# Patient Record
Sex: FEMALE | Race: WHITE | NOT HISPANIC OR LATINO
[De-identification: names, ages, dates, MRNs, and addresses within clinical notes are randomized per-mention and may not be internally consistent; named-entity substitution may affect disease eponyms.]

---

## 2021-07-12 ENCOUNTER — APPOINTMENT (OUTPATIENT)
Dept: OPHTHALMOLOGY | Facility: CLINIC | Age: 81
End: 2021-07-12

## 2021-07-13 ENCOUNTER — APPOINTMENT (OUTPATIENT)
Dept: OPHTHALMOLOGY | Facility: CLINIC | Age: 81
End: 2021-07-13

## 2021-07-20 ENCOUNTER — NON-APPOINTMENT (OUTPATIENT)
Age: 81
End: 2021-07-20

## 2021-07-20 ENCOUNTER — APPOINTMENT (OUTPATIENT)
Dept: OPHTHALMOLOGY | Facility: CLINIC | Age: 81
End: 2021-07-20
Payer: MEDICARE

## 2021-07-20 PROCEDURE — 92134 CPTRZ OPH DX IMG PST SGM RTA: CPT

## 2021-07-20 PROCEDURE — 92014 COMPRE OPH EXAM EST PT 1/>: CPT

## 2021-10-07 PROBLEM — Z00.00 ENCOUNTER FOR PREVENTIVE HEALTH EXAMINATION: Status: ACTIVE | Noted: 2021-10-07

## 2021-10-08 ENCOUNTER — APPOINTMENT (OUTPATIENT)
Dept: GASTROENTEROLOGY | Facility: CLINIC | Age: 81
End: 2021-10-08
Payer: MEDICARE

## 2021-10-08 DIAGNOSIS — K44.9 DIAPHRAGMATIC HERNIA W/OUT OBSTRUCTION OR GANGRENE: ICD-10-CM

## 2021-10-08 PROCEDURE — 99443: CPT | Mod: 95

## 2021-10-08 RX ORDER — ASPIRIN 81 MG
81 TABLET, DELAYED RELEASE (ENTERIC COATED) ORAL
Refills: 0 | Status: ACTIVE | COMMUNITY

## 2021-10-08 RX ORDER — BUDESONIDE AND FORMOTEROL FUMARATE DIHYDRATE 160; 4.5 UG/1; UG/1
160-4.5 AEROSOL RESPIRATORY (INHALATION)
Refills: 0 | Status: ACTIVE | COMMUNITY

## 2021-10-08 RX ORDER — AMLODIPINE BESYLATE 5 MG/1
5 TABLET ORAL
Refills: 0 | Status: ACTIVE | COMMUNITY

## 2021-10-08 RX ORDER — RAMIPRIL 10 MG/1
10 CAPSULE ORAL
Refills: 0 | Status: ACTIVE | COMMUNITY

## 2021-10-08 RX ORDER — ASCORBIC ACID 500 MG
TABLET ORAL
Refills: 0 | Status: ACTIVE | COMMUNITY

## 2021-10-08 RX ORDER — EPINEPHRINE CONVENIENCE KIT 1 MG/ML(1)
KIT INJECTION
Refills: 0 | Status: ACTIVE | COMMUNITY

## 2021-10-08 RX ORDER — EZETIMIBE 10 MG/1
10 TABLET ORAL
Refills: 0 | Status: ACTIVE | COMMUNITY

## 2021-10-08 RX ORDER — LEVALBUTEROL HYDROCHLORIDE 0.63 MG/3ML
0.63 SOLUTION RESPIRATORY (INHALATION)
Refills: 0 | Status: ACTIVE | COMMUNITY

## 2021-10-08 RX ORDER — CLONIDINE HYDROCHLORIDE 0.1 MG/1
0.1 TABLET ORAL
Refills: 0 | Status: ACTIVE | COMMUNITY

## 2021-10-08 RX ORDER — CALCIUM CITRATE/VITAMIN D3 315MG-6.25
TABLET ORAL
Refills: 0 | Status: ACTIVE | COMMUNITY

## 2021-10-08 NOTE — HISTORY OF PRESENT ILLNESS
[de-identified] : Due to COVID 19 pandemic, telephonic visit was scheduled to decrease any chance of exposure. Verbal consent was obtained from the patient.\par \par 81-year-old woman who is being evaluated for increase in the size of the pancreatic cystic lesions.  She also has history of chronic constipation and diverticulitis requiring partial colon resection in the past.  She has been on medication for chronic constipation.  She was noted to have pancreatic cystic lesion about 40 years ago and now there is increase in the size of time.  I reviewed the CT abdominal and the MRI abdominal results lately.  She has history of asthma.  That is well controlled.  She also has seen cardiologist.  The largest pancreatic cystic lesion was 2.5 cm x 1.2 cm.  It was previously 1 cm x 0.4 cm.  The second largest lesion is 1.4 cm x 1.1 cm which was previously 1.2 cm x 0.8 cm.  Denies any history of pancreatitis.  She denies any family history of pancreatic cancer.\par

## 2021-10-08 NOTE — ASSESSMENT
[FreeTextEntry1] : I have discussed her CT abdominal and MRI abdomen at length.  At this time, we will request a pulmonary and cardiology evaluation prior to the scheduling of EGD/EUS.  Patient otherwise appears medically optimized.  We discussed the differential diagnosis of the pancreatic cystic lesions at length. Risks (including bleeding, pain, perforation, incomplete examination, adverse reactions to medications, aspiration and death), benefits and alternatives were discussed. Patient is agreeable for the EGD. The patient is medically optimized for the procedure. We will schedule the patient for the procedure.\par \par I spent 25 minutes on the encounter\par \par \par Nic Yi MD\par Gastroenterology \par \par

## 2021-10-28 ENCOUNTER — TRANSCRIPTION ENCOUNTER (OUTPATIENT)
Age: 81
End: 2021-10-28

## 2021-11-03 ENCOUNTER — APPOINTMENT (OUTPATIENT)
Dept: GASTROENTEROLOGY | Facility: CLINIC | Age: 81
End: 2021-11-03

## 2021-11-07 DIAGNOSIS — Z01.818 ENCOUNTER FOR OTHER PREPROCEDURAL EXAMINATION: ICD-10-CM

## 2021-11-08 ENCOUNTER — APPOINTMENT (OUTPATIENT)
Dept: DISASTER EMERGENCY | Facility: CLINIC | Age: 81
End: 2021-11-08

## 2021-11-09 LAB — SARS-COV-2 N GENE NPH QL NAA+PROBE: NOT DETECTED

## 2021-11-11 ENCOUNTER — APPOINTMENT (OUTPATIENT)
Dept: GASTROENTEROLOGY | Facility: HOSPITAL | Age: 81
End: 2021-11-11

## 2021-11-11 ENCOUNTER — TRANSCRIPTION ENCOUNTER (OUTPATIENT)
Age: 81
End: 2021-11-11

## 2021-11-11 ENCOUNTER — OUTPATIENT (OUTPATIENT)
Dept: OUTPATIENT SERVICES | Facility: HOSPITAL | Age: 81
LOS: 1 days | End: 2021-11-11
Payer: MEDICARE

## 2021-11-11 ENCOUNTER — RESULT REVIEW (OUTPATIENT)
Age: 81
End: 2021-11-11

## 2021-11-11 DIAGNOSIS — K86.2 CYST OF PANCREAS: ICD-10-CM

## 2021-11-11 PROCEDURE — 43242 EGD US FINE NEEDLE BX/ASPIR: CPT

## 2021-11-11 PROCEDURE — 88173 CYTOPATH EVAL FNA REPORT: CPT

## 2021-11-11 PROCEDURE — 88305 TISSUE EXAM BY PATHOLOGIST: CPT

## 2021-11-11 PROCEDURE — 88342 IMHCHEM/IMCYTCHM 1ST ANTB: CPT

## 2021-11-11 PROCEDURE — 43238 EGD US FINE NEEDLE BX/ASPIR: CPT

## 2021-11-11 PROCEDURE — 88342 IMHCHEM/IMCYTCHM 1ST ANTB: CPT | Mod: 26

## 2021-11-11 PROCEDURE — 88305 TISSUE EXAM BY PATHOLOGIST: CPT | Mod: 26

## 2021-11-11 PROCEDURE — 43239 EGD BIOPSY SINGLE/MULTIPLE: CPT | Mod: 59

## 2021-11-11 PROCEDURE — 88173 CYTOPATH EVAL FNA REPORT: CPT | Mod: 26

## 2021-11-16 LAB
NON-GYNECOLOGICAL CYTOLOGY STUDY: SIGNIFICANT CHANGE UP
SURGICAL PATHOLOGY STUDY: SIGNIFICANT CHANGE UP

## 2021-11-22 ENCOUNTER — APPOINTMENT (OUTPATIENT)
Dept: OPHTHALMOLOGY | Facility: CLINIC | Age: 81
End: 2021-11-22
Payer: MEDICARE

## 2021-11-22 ENCOUNTER — NON-APPOINTMENT (OUTPATIENT)
Age: 81
End: 2021-11-22

## 2021-11-22 PROCEDURE — 92012 INTRM OPH EXAM EST PATIENT: CPT

## 2021-12-12 ENCOUNTER — TRANSCRIPTION ENCOUNTER (OUTPATIENT)
Age: 81
End: 2021-12-12

## 2021-12-29 ENCOUNTER — APPOINTMENT (OUTPATIENT)
Dept: GASTROENTEROLOGY | Facility: CLINIC | Age: 81
End: 2021-12-29
Payer: MEDICARE

## 2021-12-29 VITALS
RESPIRATION RATE: 16 BRPM | DIASTOLIC BLOOD PRESSURE: 60 MMHG | BODY MASS INDEX: 23.46 KG/M2 | TEMPERATURE: 98 F | OXYGEN SATURATION: 97 % | SYSTOLIC BLOOD PRESSURE: 110 MMHG | WEIGHT: 146 LBS | HEART RATE: 89 BPM | HEIGHT: 66 IN

## 2021-12-29 DIAGNOSIS — K59.09 OTHER CONSTIPATION: ICD-10-CM

## 2021-12-29 PROCEDURE — 99213 OFFICE O/P EST LOW 20 MIN: CPT

## 2021-12-29 NOTE — ASSESSMENT
[FreeTextEntry1] : I discussed the EUS findings at length.  The patient has statistically indolent mucinous cystic lesion.  I have recommended an MRI in April along with MRCP.  We will evaluate her in March again to order the MRI.  If there is any further change in the characteristic or the size of the cystic lesion, she will need a FNA.\par \par Nic Yi MD\par Gastroenterology \par \par

## 2021-12-29 NOTE — HISTORY OF PRESENT ILLNESS
[de-identified] : Patient arrived for a follow-up visit. She has history of pancreatic cystic lesions. He has history of chronic constipation and diverticulitis requiring partial colon resection in the past. She underwent EGD and EUS with FNA of the pancreatic cystic lesion.There was a multilobulated pancreatic neck lesion which measured 2.5 x 1 cm. There was additionally a pancreatic body cystic lesion which could not have FNA due to the PD in the way of FNA trajectory. She had a large hiatal hernia. FNA revealed elevated CEA level of 130,000. Amylase was 175. It was statistically indolent on the interpace molecular analysis. Cytology was nondiagnostic.

## 2022-01-11 ENCOUNTER — APPOINTMENT (OUTPATIENT)
Dept: OPHTHALMOLOGY | Facility: CLINIC | Age: 82
End: 2022-01-11
Payer: MEDICARE

## 2022-01-11 ENCOUNTER — NON-APPOINTMENT (OUTPATIENT)
Age: 82
End: 2022-01-11

## 2022-01-11 PROCEDURE — 92014 COMPRE OPH EXAM EST PT 1/>: CPT

## 2022-01-11 PROCEDURE — 92250 FUNDUS PHOTOGRAPHY W/I&R: CPT

## 2022-03-17 ENCOUNTER — APPOINTMENT (OUTPATIENT)
Dept: GASTROENTEROLOGY | Facility: CLINIC | Age: 82
End: 2022-03-17
Payer: MEDICARE

## 2022-03-17 DIAGNOSIS — Z09 ENCOUNTER FOR FOLLOW-UP EXAMINATION AFTER COMPLETED TREATMENT FOR CONDITIONS OTHER THAN MALIGNANT NEOPLASM: ICD-10-CM

## 2022-03-17 PROCEDURE — 99442: CPT | Mod: 95

## 2022-03-17 NOTE — HISTORY OF PRESENT ILLNESS
[de-identified] : Due to COVID 19 pandemic, telephonic visit was scheduled to decrease any chance of exposure. Verbal consent was obtained from the patient.  The patient was at home and I was present at [address] [place] [address]., [facility].\par She has history of pancreatic cystic lesions. He has history of chronic constipation and diverticulitis requiring partial colon resection in the past. She underwent EGD and EUS with FNA of the pancreatic cystic lesion.There was a multilobulated pancreatic neck lesion which measured 2.5 x 1 cm. There was additionally a pancreatic body cystic lesion which could not have FNA due to the PD in the way of FNA trajectory. She had a large hiatal hernia. FNA revealed elevated CEA level of 130,000. Amylase was 175. It was statistically indolent on the interpace molecular analysis. Cytology was nondiagnostic. \par \par The patient had seen Dr. Radha Tsang.  She has ordered repeat MRI which will be performed in middle of April.  Patient has no complaints currently.

## 2022-03-17 NOTE — ASSESSMENT
[FreeTextEntry1] : We had a nice discussion regarding the incidence, prevalence of the pancreatic cystic lesion and their outcomes.  Most likely the patient has statistically indolent mucinous cystic lesion.  If it stays stable on the MRI and MRCP, we will perform yearly/annual MRI surveillance.  However if there is any change in the size and characteristics of the cystic lesion repeat FNA or surgical oncology referral will be performed.  Extensive counseling was performed.  Follow-up on a telephonic platform in May 2022.\par \par I spent 15 minutes on the encounter\par \par Nic Yi MD\par Gastroenterology \par \par

## 2022-04-25 ENCOUNTER — APPOINTMENT (OUTPATIENT)
Dept: ORTHOPEDIC SURGERY | Facility: CLINIC | Age: 82
End: 2022-04-25

## 2022-05-03 ENCOUNTER — APPOINTMENT (OUTPATIENT)
Dept: PHYSICAL MEDICINE AND REHAB | Facility: CLINIC | Age: 82
End: 2022-05-03
Payer: MEDICARE

## 2022-05-03 VITALS
OXYGEN SATURATION: 100 % | SYSTOLIC BLOOD PRESSURE: 110 MMHG | BODY MASS INDEX: 23.78 KG/M2 | DIASTOLIC BLOOD PRESSURE: 60 MMHG | WEIGHT: 148 LBS | HEART RATE: 69 BPM | HEIGHT: 66 IN

## 2022-05-03 DIAGNOSIS — G43.909 MIGRAINE, UNSPECIFIED, NOT INTRACTABLE, W/OUT STATUS MIGRAINOSUS: ICD-10-CM

## 2022-05-03 DIAGNOSIS — Z83.3 FAMILY HISTORY OF DIABETES MELLITUS: ICD-10-CM

## 2022-05-03 DIAGNOSIS — J45.909 UNSPECIFIED ASTHMA, UNCOMPLICATED: ICD-10-CM

## 2022-05-03 DIAGNOSIS — Z82.49 FAMILY HISTORY OF ISCHEMIC HEART DISEASE AND OTHER DISEASES OF THE CIRCULATORY SYSTEM: ICD-10-CM

## 2022-05-03 DIAGNOSIS — E78.00 PURE HYPERCHOLESTEROLEMIA, UNSPECIFIED: ICD-10-CM

## 2022-05-03 DIAGNOSIS — Z82.3 FAMILY HISTORY OF STROKE: ICD-10-CM

## 2022-05-03 DIAGNOSIS — B99.9 UNSPECIFIED INFECTIOUS DISEASE: ICD-10-CM

## 2022-05-03 PROCEDURE — 99205 OFFICE O/P NEW HI 60 MIN: CPT

## 2022-05-03 RX ORDER — COMMON SHRIMP 0.1 MG/ML
INJECTION, SOLUTION PERCUTANEOUS
Refills: 0 | Status: ACTIVE | COMMUNITY

## 2022-05-03 RX ORDER — LEVOCETIRIZINE DIHYDROCHLORIDE 5 MG/1
TABLET, FILM COATED ORAL
Refills: 0 | Status: ACTIVE | COMMUNITY

## 2022-05-03 RX ORDER — EZETIMIBE 10 MG/1
TABLET ORAL
Refills: 0 | Status: ACTIVE | COMMUNITY

## 2022-05-03 RX ORDER — CLONIDINE HYDROCHLORIDE 0.3 MG/1
TABLET ORAL
Refills: 0 | Status: ACTIVE | COMMUNITY

## 2022-05-03 RX ORDER — ESOMEPRAZOLE MAGNESIUM 40 MG/1
40 CAPSULE, DELAYED RELEASE ORAL
Refills: 0 | Status: ACTIVE | COMMUNITY

## 2022-05-03 RX ORDER — BUDESONIDE AND FORMOTEROL FUMARATE DIHYDRATE 160; 4.5 UG/1; UG/1
AEROSOL RESPIRATORY (INHALATION)
Refills: 0 | Status: ACTIVE | COMMUNITY

## 2022-05-03 RX ORDER — SPIRONOLACTONE 50 MG/1
TABLET ORAL
Refills: 0 | Status: ACTIVE | COMMUNITY

## 2022-05-03 RX ORDER — EPINEPHRINE 1 MG/ML
INJECTION INTRAMUSCULAR; INTRAVENOUS; SUBCUTANEOUS
Refills: 0 | Status: ACTIVE | COMMUNITY

## 2022-05-03 RX ORDER — LEVALBUTEROL HYDROCHLORIDE 0.31 MG/3ML
SOLUTION RESPIRATORY (INHALATION)
Refills: 0 | Status: ACTIVE | COMMUNITY

## 2022-05-03 RX ORDER — ROSUVASTATIN CALCIUM 5 MG/1
TABLET, FILM COATED ORAL
Refills: 0 | Status: ACTIVE | COMMUNITY

## 2022-05-03 RX ORDER — HYOSCYAMINE SULFATE 0.15 MG
TABLET ORAL
Refills: 0 | Status: ACTIVE | COMMUNITY

## 2022-05-03 RX ORDER — TRAMADOL HYDROCHLORIDE 25 MG/1
TABLET, COATED ORAL
Refills: 0 | Status: ACTIVE | COMMUNITY

## 2022-05-03 RX ORDER — RAMIPRIL 10 MG/1
10 CAPSULE ORAL
Refills: 0 | Status: ACTIVE | COMMUNITY

## 2022-05-03 RX ORDER — AMLODIPINE AND ATORVASTATIN 2.5; 4 MG/1; MG/1
TABLET, COATED ORAL
Refills: 0 | Status: ACTIVE | COMMUNITY

## 2022-05-03 NOTE — ASSESSMENT
[FreeTextEntry1] : Will initiate Trigger Point Injections 1xweekly/6weeks to lumbar paraspinal and gluteal musculature. The goal of which is to decrease pain, dissipate muscle spasm and improve level of function. \par Continue to PT to L/S 2xweek/4weeks\par Follow-up with Dr. Seay. \par HEP reviewed with pt.\par Instruction in proper body mechanics and posturing.\par \par Recheck in 4-6 weeks.

## 2022-05-03 NOTE — CONSULT LETTER
[Consult Letter:] : I had the pleasure of evaluating your patient, [unfilled]. [Please see my note below.] : Please see my note below. [Sincerely,] : Sincerely, [FreeTextEntry3] : Marquez Sarah MD

## 2022-05-03 NOTE — HISTORY OF PRESENT ILLNESS
[FreeTextEntry1] : Pt is an 81 year old female who presents to my office for evaluation of low back pain with radicular pain and paraesthesia involving her left lower extremity. She reports the onset of pain was approximately the beginning of February of 2022. She is unaware of any precipitated events prior to the onset of her pain. Pt reports she was first evaluated by Dr. Alvarez for low back pain. MRI testing of the lumbar spine was obtained. She was advised she was a surgical candidate, however, she declined that option. Dr. Alvarez then referred her to Dr. Seay PM. Pt reports she was evaluated by Dr. Seay on 03/21/2022 and a lumbar WESTON was recommended. However, lumbar ESIs have not performed to date. Approximately 2 weeks ago she started PT to her low back. \par Pain is aggravated with prolonged sitting, standing and walking. Pain is alleviated with change of position and forward flexion. Denies bladder disfunction. Pt reports pain level is an 8/10 at rest and 10/10 at worst during activity.

## 2022-05-03 NOTE — PHYSICAL EXAM
[FreeTextEntry1] : LUMBAR & LOWER EXTREMITIES\par 	 \par UPON INSPECTION: \par THORACO LUMBAR SCOLIOSIS									EXAGGERATION THORACIC KYPHOSIS									\par \par REFLEXES (R) LE:		\par 	QUADRICEPS 2	 \par 	ACHILLES 2\par \par REFLEXES (L) LE:		\par 	QUADRICEPS 2		 \par                 ACHILLES 2\par \par SENSORY LE: Decreased sensation left L4 dermatome \par \par No gross atrophy 	\par 	\par Normal gait \par \par TESTING:\par 	BABINSKI [downgoing bilaterally ]\par 	CHADDOCK [- bilaterally ]\par 	OPPENHEIM [- bilaterally ]\par 	GONDA [ - bilaterally]\par 	CLONUS [- ankle bilaterally ]\par 	LESEAGUE’S (R) [ -]\par 	LESEAGUE’S (L) [- ]\par 		\par SI JT LIG.CHALLENEGE TEST (R) -\par SI JT LIG.CHALLENEGE TEST (L) +\par 	S.L.R. ( R ) -60 degrees with hamstring tightness\par                 S.L.R. ( R ) -60 degrees with hamstring tightness\par RANGE OF MOTION:\par 	FLEXION 55 degrees\par 	EXTENSION 8 degrees with paralumbar and left gluteal musculature \par 	LATERAL BENDING: ( R ) 24 degrees\par 	LATERAL BENDING: ( L ) 22 degrees\par 	THORACIC ROTATION ( R ) 25 degrees\par 	THORACIC ROTATION ( L ) 20 degrees\par  	INTERNAL ROTATION FEMUR ( R ) WNL\par 	EXTERNAL ROTATION FEMUR ( R ) WNL\par 	INTERNAL ROTATION FEMUR ( L ) WNL\par 	EXTERNAL ROTATION FEMUR ( R ) WNL\par \par Good peripheral pulses bilaterally \par VIBRATORY: mild difficulty on the left \par PROPRIOCEPTION: intact bilaterally \par 	MMT: intact \par Palpation of the lumbar spine: Tenderness involving the L4/L5 and L5/S1 interspace. SI joints are tender bilaterally. Tenderness and spasm bilateral lower lumbars with greater involvement on the left. Trigger points bilateral gluteal musculature with greater involvement on the left. Tenderness and spasm involving the left piriformis. [Normal] : Heart rate was normal and rhythm regular, normal S1 and S2, no gallops, no murmurs and no pericardial rub [de-identified] : see exam [de-identified] : see exam [de-identified] : see exam

## 2022-05-04 ENCOUNTER — APPOINTMENT (OUTPATIENT)
Dept: GASTROENTEROLOGY | Facility: CLINIC | Age: 82
End: 2022-05-04
Payer: MEDICARE

## 2022-05-04 PROCEDURE — 99441: CPT | Mod: 95

## 2022-05-04 NOTE — ASSESSMENT
[FreeTextEntry1] : I have discussed with the patient that she should undergo repeat MRI in 6 months.  We will follow her after that.  She can follow-up with Dr. Radha Tsang and with us simultaneously.\par \par I spent 10 minutes on the encounter\par \par Nic Yi MD\par Gastroenterology \par \par

## 2022-05-04 NOTE — HISTORY OF PRESENT ILLNESS
[de-identified] : Due to COVID 19 pandemic, telephonic visit was scheduled to decrease any chance of exposure. Verbal consent was obtained from the patient.\par The patient was at home and I was at [address] [place] [address] office.  The patient was initially referred for pancreatic cystic lesion evaluation.She has history of chronic constipation and diverticulitis requiring partial colon resection in the past. She underwent EGD and EUS with FNA of the pancreatic cystic lesion.There was a multilobulated pancreatic neck lesion which measured 2.5 x 1 cm. There was additionally a pancreatic body cystic lesion which could not have FNA due to the PD in the way of FNA trajectory. She had a large hiatal hernia. FNA revealed elevated CEA level of 130,000. Amylase was 175. It was statistically indolent on the interpace molecular analysis. Cytology was nondiagnostic. \par \par She had a repeat MRI performed. MRI revealed non change in the size of the cystic lesions. Rather they were less prominent. 6 months follow up was recommended.

## 2022-05-11 ENCOUNTER — APPOINTMENT (OUTPATIENT)
Dept: PHYSICAL MEDICINE AND REHAB | Facility: CLINIC | Age: 82
End: 2022-05-11
Payer: MEDICARE

## 2022-05-11 PROCEDURE — 20553 NJX 1/MLT TRIGGER POINTS 3/>: CPT

## 2022-05-11 PROCEDURE — 99213 OFFICE O/P EST LOW 20 MIN: CPT | Mod: 25

## 2022-05-11 RX ORDER — LIDOCAINE HYDROCHLORIDE 10 MG/ML
1 INJECTION, SOLUTION INFILTRATION; PERINEURAL
Refills: 0 | Status: COMPLETED | OUTPATIENT
Start: 2022-05-11

## 2022-05-12 ENCOUNTER — APPOINTMENT (OUTPATIENT)
Dept: PHYSICAL MEDICINE AND REHAB | Facility: CLINIC | Age: 82
End: 2022-05-12

## 2022-05-18 ENCOUNTER — APPOINTMENT (OUTPATIENT)
Dept: PHYSICAL MEDICINE AND REHAB | Facility: CLINIC | Age: 82
End: 2022-05-18
Payer: MEDICARE

## 2022-05-18 PROCEDURE — 99213 OFFICE O/P EST LOW 20 MIN: CPT | Mod: 25

## 2022-05-18 PROCEDURE — 20553 NJX 1/MLT TRIGGER POINTS 3/>: CPT

## 2022-05-25 ENCOUNTER — APPOINTMENT (OUTPATIENT)
Dept: PHYSICAL MEDICINE AND REHAB | Facility: CLINIC | Age: 82
End: 2022-05-25
Payer: MEDICARE

## 2022-05-25 PROCEDURE — 20553 NJX 1/MLT TRIGGER POINTS 3/>: CPT

## 2022-05-25 PROCEDURE — 99213 OFFICE O/P EST LOW 20 MIN: CPT | Mod: 25

## 2022-05-25 RX ORDER — LIDOCAINE HYDROCHLORIDE 10 MG/ML
1 INJECTION, SOLUTION INFILTRATION; PERINEURAL
Refills: 0 | Status: COMPLETED | OUTPATIENT
Start: 2022-05-25

## 2022-05-27 ENCOUNTER — APPOINTMENT (OUTPATIENT)
Dept: OPHTHALMOLOGY | Facility: CLINIC | Age: 82
End: 2022-05-27
Payer: MEDICARE

## 2022-05-27 ENCOUNTER — NON-APPOINTMENT (OUTPATIENT)
Age: 82
End: 2022-05-27

## 2022-05-27 PROCEDURE — 99213 OFFICE O/P EST LOW 20 MIN: CPT

## 2022-06-01 ENCOUNTER — APPOINTMENT (OUTPATIENT)
Dept: PHYSICAL MEDICINE AND REHAB | Facility: CLINIC | Age: 82
End: 2022-06-01

## 2022-06-08 ENCOUNTER — APPOINTMENT (OUTPATIENT)
Dept: PHYSICAL MEDICINE AND REHAB | Facility: CLINIC | Age: 82
End: 2022-06-08
Payer: MEDICARE

## 2022-06-08 PROCEDURE — 99213 OFFICE O/P EST LOW 20 MIN: CPT | Mod: 25

## 2022-06-08 PROCEDURE — 20553 NJX 1/MLT TRIGGER POINTS 3/>: CPT

## 2022-06-15 ENCOUNTER — APPOINTMENT (OUTPATIENT)
Dept: PHYSICAL MEDICINE AND REHAB | Facility: CLINIC | Age: 82
End: 2022-06-15

## 2022-06-15 PROCEDURE — 20553 NJX 1/MLT TRIGGER POINTS 3/>: CPT

## 2022-06-15 PROCEDURE — 99213 OFFICE O/P EST LOW 20 MIN: CPT | Mod: 25

## 2022-06-22 ENCOUNTER — APPOINTMENT (OUTPATIENT)
Dept: PHYSICAL MEDICINE AND REHAB | Facility: CLINIC | Age: 82
End: 2022-06-22

## 2022-06-22 PROCEDURE — 99213 OFFICE O/P EST LOW 20 MIN: CPT | Mod: 25

## 2022-06-22 PROCEDURE — 20553 NJX 1/MLT TRIGGER POINTS 3/>: CPT

## 2022-06-22 RX ADMIN — Medication 10 %: at 00:00

## 2022-06-29 ENCOUNTER — APPOINTMENT (OUTPATIENT)
Dept: PHYSICAL MEDICINE AND REHAB | Facility: CLINIC | Age: 82
End: 2022-06-29

## 2022-06-29 PROCEDURE — 99213 OFFICE O/P EST LOW 20 MIN: CPT

## 2022-06-29 NOTE — HISTORY OF PRESENT ILLNESS
[FreeTextEntry1] : Pt reports improvement with TPIs. Pt states she is able to stand and ambulate for longer periods of time. Less difficulty preforming ADLs

## 2022-06-29 NOTE — PHYSICAL EXAM
[FreeTextEntry1] : EXAMINATION OF LUMBAR SPINE:\par  \par Flexion:  60° \par Extension:  12°\par Lateral Bend: R: 27° \par                        L:  25°\par  \par Palpation of the Lumbar Spine: Decreasing tenderness and spasm left gluteal musculature. Tenderness and spasm improving bilateral lower lumbar paraspinals. \par \par MMT L/E: grossly intact \par \par \par \par  [Normal] : Heart rate was normal and rhythm regular, normal S1 and S2, no gallops, no murmurs and no pericardial rub [de-identified] : see exam [de-identified] : see exam [de-identified] : see exam

## 2022-06-29 NOTE — ASSESSMENT
[FreeTextEntry1] : Continue to TPI to L/S every other week x6 weeks. \par HEP reviewed with pt.\par Instruction in proper body mechanics and posturing.\par \par Recheck in 4-6 weeks.

## 2022-07-12 ENCOUNTER — APPOINTMENT (OUTPATIENT)
Dept: OPHTHALMOLOGY | Facility: CLINIC | Age: 82
End: 2022-07-12

## 2022-07-12 ENCOUNTER — NON-APPOINTMENT (OUTPATIENT)
Age: 82
End: 2022-07-12

## 2022-07-12 PROCEDURE — 92134 CPTRZ OPH DX IMG PST SGM RTA: CPT

## 2022-07-12 PROCEDURE — 92014 COMPRE OPH EXAM EST PT 1/>: CPT

## 2022-07-13 ENCOUNTER — APPOINTMENT (OUTPATIENT)
Dept: PHYSICAL MEDICINE AND REHAB | Facility: CLINIC | Age: 82
End: 2022-07-13

## 2022-08-03 ENCOUNTER — APPOINTMENT (OUTPATIENT)
Dept: PHYSICAL MEDICINE AND REHAB | Facility: CLINIC | Age: 82
End: 2022-08-03

## 2022-08-03 PROCEDURE — 99212 OFFICE O/P EST SF 10 MIN: CPT

## 2022-08-03 NOTE — HISTORY OF PRESENT ILLNESS
[FreeTextEntry1] : Pt reports good improvement with trigger point inj as it relates to her lower back complaints. Reports she is relatively pain free at the current time; is able to sit, stand, and ambulate without difficulty.

## 2022-08-03 NOTE — PHYSICAL EXAM
[FreeTextEntry1] : EXAMINATION OF LUMBAR SPINE:\par  \par Flexion:65  ° \par Extension:15  °\par Lateral Bend: R:30 ° \par                        L:  30°\par  \par Palpation of the Lumbar Spine: Non-tender \par \par MMT L/E: grossly intact\par \par \par

## 2022-08-04 ENCOUNTER — NON-APPOINTMENT (OUTPATIENT)
Age: 82
End: 2022-08-04

## 2022-10-15 ENCOUNTER — NON-APPOINTMENT (OUTPATIENT)
Age: 82
End: 2022-10-15

## 2022-10-19 ENCOUNTER — APPOINTMENT (OUTPATIENT)
Dept: PHYSICAL MEDICINE AND REHAB | Facility: CLINIC | Age: 82
End: 2022-10-19

## 2022-10-19 PROCEDURE — 99213 OFFICE O/P EST LOW 20 MIN: CPT | Mod: 25

## 2022-10-19 PROCEDURE — 20553 NJX 1/MLT TRIGGER POINTS 3/>: CPT

## 2022-10-19 PROCEDURE — J3490N: CUSTOM

## 2022-10-26 ENCOUNTER — APPOINTMENT (OUTPATIENT)
Dept: PHYSICAL MEDICINE AND REHAB | Facility: CLINIC | Age: 82
End: 2022-10-26

## 2022-10-26 PROCEDURE — J3490N: CUSTOM

## 2022-10-26 PROCEDURE — 99213 OFFICE O/P EST LOW 20 MIN: CPT | Mod: 25

## 2022-10-26 PROCEDURE — 20553 NJX 1/MLT TRIGGER POINTS 3/>: CPT

## 2022-11-07 ENCOUNTER — APPOINTMENT (OUTPATIENT)
Dept: PHYSICAL MEDICINE AND REHAB | Facility: CLINIC | Age: 82
End: 2022-11-07

## 2022-11-14 ENCOUNTER — APPOINTMENT (OUTPATIENT)
Dept: PHYSICAL MEDICINE AND REHAB | Facility: CLINIC | Age: 82
End: 2022-11-14

## 2022-11-14 PROCEDURE — 20553 NJX 1/MLT TRIGGER POINTS 3/>: CPT

## 2022-11-14 PROCEDURE — 99213 OFFICE O/P EST LOW 20 MIN: CPT | Mod: 25

## 2022-11-14 NOTE — PROCEDURE
[de-identified] : Pt reporting some improvement with trigger point injections. Decreasing pain, increasing ROM. \par \par Sterile Technique Injection \par 2 Syringes of 5 cc 1 % Lidocaine HCL \par \par Bilateral Gluteus medius \par Bilateral  Gluteus leland \par Bilateral Piriformis\par \par Ice injection site PRN \par Injection tolerated well.\par \par Examination of the Lumbar Spine \par Flexion 54 degrees\par Extension 12 degrees\par Lateral Bend R  28 degrees  L 25 degrees\par \par Palpation of the Lumbar Spine: Trigger points  bilateral gluteal musculature persist,improved\par MMT L/E: intact \par \par \par \par

## 2022-11-15 ENCOUNTER — APPOINTMENT (OUTPATIENT)
Dept: GASTROENTEROLOGY | Facility: CLINIC | Age: 82
End: 2022-11-15

## 2022-11-15 VITALS
HEART RATE: 72 BPM | WEIGHT: 145 LBS | OXYGEN SATURATION: 98 % | HEIGHT: 66 IN | DIASTOLIC BLOOD PRESSURE: 64 MMHG | RESPIRATION RATE: 14 BRPM | BODY MASS INDEX: 23.3 KG/M2 | SYSTOLIC BLOOD PRESSURE: 124 MMHG | TEMPERATURE: 98.2 F

## 2022-11-15 DIAGNOSIS — K86.2 CYST OF PANCREAS: ICD-10-CM

## 2022-11-15 PROCEDURE — 99213 OFFICE O/P EST LOW 20 MIN: CPT

## 2022-11-15 RX ORDER — CLINDAMYCIN HYDROCHLORIDE 300 MG/1
300 CAPSULE ORAL EVERY 6 HOURS
Qty: 12 | Refills: 0 | Status: DISCONTINUED | COMMUNITY
Start: 2021-11-11 | End: 2022-11-15

## 2022-11-16 RX ORDER — NITROFURANTOIN MACROCRYSTALS 50 MG/1
50 CAPSULE ORAL
Qty: 45 | Refills: 0 | Status: ACTIVE | COMMUNITY
Start: 2022-03-07

## 2022-11-16 RX ORDER — ROSUVASTATIN CALCIUM 20 MG/1
20 TABLET, FILM COATED ORAL
Qty: 90 | Refills: 0 | Status: ACTIVE | COMMUNITY
Start: 2022-08-16

## 2022-11-16 RX ORDER — CHLORHEXIDINE GLUCONATE, 0.12% ORAL RINSE 1.2 MG/ML
0.12 SOLUTION DENTAL
Qty: 473 | Refills: 0 | Status: ACTIVE | COMMUNITY
Start: 2022-11-04

## 2022-11-16 RX ORDER — DOXYCYCLINE HYCLATE 100 MG/1
100 CAPSULE ORAL
Qty: 14 | Refills: 0 | Status: ACTIVE | COMMUNITY
Start: 2022-10-08

## 2022-11-16 RX ORDER — HYOSCYAMINE SULFATE 0.12 MG/1
0.12 TABLET SUBLINGUAL
Qty: 90 | Refills: 0 | Status: ACTIVE | COMMUNITY
Start: 2022-09-01

## 2022-11-16 RX ORDER — LEVALBUTEROL TARTRATE 45 UG/1
45 AEROSOL, METERED ORAL
Qty: 15 | Refills: 0 | Status: ACTIVE | COMMUNITY
Start: 2022-06-21

## 2022-11-16 RX ORDER — TRIAMCINOLONE ACETONIDE 1 MG/G
0.1 CREAM TOPICAL
Qty: 60 | Refills: 0 | Status: ACTIVE | COMMUNITY
Start: 2022-11-02

## 2022-11-16 RX ORDER — EPINEPHRINE 0.3 MG/.3ML
0.3 INJECTION INTRAMUSCULAR
Qty: 2 | Refills: 0 | Status: ACTIVE | COMMUNITY
Start: 2022-11-02

## 2022-11-16 RX ORDER — BENZONATATE 100 MG/1
100 CAPSULE ORAL
Qty: 21 | Refills: 0 | Status: ACTIVE | COMMUNITY
Start: 2022-10-16

## 2022-11-16 RX ORDER — HYOSCYAMINE SULFATE 0.12 MG/1
0.12 TABLET, ORALLY DISINTEGRATING ORAL
Qty: 180 | Refills: 0 | Status: ACTIVE | COMMUNITY
Start: 2022-10-27

## 2022-11-16 NOTE — HISTORY OF PRESENT ILLNESS
[FreeTextEntry1] : [id number]\par \par The patient was initially referred for pancreatic cystic lesion evaluation.She has history of chronic constipation and diverticulitis requiring partial colon resection in the past. She underwent EGD and EUS with FNA of the pancreatic cystic lesion.There was a multilobulated pancreatic neck lesion which measured 2.5 x 1 cm. There was additionally a pancreatic body cystic lesion which could not have FNA due to the PD in the way of FNA trajectory. She had a large hiatal hernia. FNA revealed elevated CEA level of 130,000. Amylase was 175. It was statistically indolent on the interpace molecular analysis. Cytology was nondiagnostic. \par \par She had a repeat MRI performed. MRI revealed non change in the size of the cystic lesions. Rather they were less prominent. 6 months follow up was recommended. She had MRI performed on November 9, 2022.  Stable pancreatic cystic lesions were noted in the neck and the body area.  Pancreatic head 1.3 cm cystic lesion was noted.  No suspicious features.  2-year follow-up was recommended.  She has no symptoms.\par \par \par

## 2022-11-16 NOTE — ASSESSMENT
[FreeTextEntry1] : The patient is doing very well at this time.  We have discussed her MRI abdominal at length.  She will follow-up in 6 to 9 months and then we will reevaluate her for pancreatic cystic lesions.\par \par Nic Yi MD\par Gastroenterology \par \par

## 2022-11-16 NOTE — PHYSICAL EXAM

## 2022-11-21 ENCOUNTER — APPOINTMENT (OUTPATIENT)
Dept: PHYSICAL MEDICINE AND REHAB | Facility: CLINIC | Age: 82
End: 2022-11-21

## 2022-11-21 PROCEDURE — 99212 OFFICE O/P EST SF 10 MIN: CPT

## 2022-11-21 NOTE — PHYSICAL EXAM
[FreeTextEntry1] : EXAMINATION OF LUMBAR SPINE:\par  \par Flexion:65  ° \par Extension:15  °\par Lateral Bend: R:25 ° \par                        L:  30°\par  \par Palpation of the Lumbar Spine: Mild tenderness bilateral lower lumbar paraspinal musculature.  Trigger points involving gluteal musculature improved but persist\par Straight leg raising negative to 60 degrees bilaterally\par \par MMT L/E: grossly intact\par \par \par

## 2022-11-21 NOTE — HISTORY OF PRESENT ILLNESS
[FreeTextEntry1] : Pt reports she continues to find trigger point injections beneficial in decreasing her low back pain and improving her overall level of function.

## 2022-11-21 NOTE — ASSESSMENT
[FreeTextEntry1] : Trigger point junction 2 times monthly\par Home exercise program reviewed with patient\par \par recheck in 8 weeks.

## 2022-11-28 ENCOUNTER — APPOINTMENT (OUTPATIENT)
Dept: PHYSICAL MEDICINE AND REHAB | Facility: CLINIC | Age: 82
End: 2022-11-28

## 2022-11-28 PROCEDURE — 99213 OFFICE O/P EST LOW 20 MIN: CPT | Mod: 25

## 2022-11-28 PROCEDURE — 20553 NJX 1/MLT TRIGGER POINTS 3/>: CPT

## 2022-11-28 NOTE — PROCEDURE
[de-identified] : Pt reporting some improvement with trigger point injections. Decreasing pain, increasing ROM. \par \par Sterile Technique Injection \par 2 Syringes of 5 cc 1 % Lidocaine HCL \par \par left Gluteus medius \par left   Gluteus leland\par  left  Piriformis\par \par Ice injection site PRN \par Injection tolerated well.\par \par Examination of the Lumbar Spine \par Flexion 54 degrees\par Extension 12 degrees\par Lateral Bend R  28 degrees  L 25 degrees\par \par Palpation of the Lumbar Spine: Trigger points  bilateral gluteal musculature \par \par MMT L/E: intact \par \par \par \par

## 2022-12-05 ENCOUNTER — APPOINTMENT (OUTPATIENT)
Dept: PHYSICAL MEDICINE AND REHAB | Facility: CLINIC | Age: 82
End: 2022-12-05

## 2022-12-05 PROCEDURE — 99213 OFFICE O/P EST LOW 20 MIN: CPT

## 2022-12-05 NOTE — PROCEDURE
[de-identified] : Pt reporting some improvement with trigger point injections. Decreasing pain, increasing ROM. \par \par Sterile Technique Injection \par 2 Syringes of 5 cc 1 % Lidocaine HCL \par \par left Gluteus medius \par left   Gluteus leland\par  left  Piriformis\par \par Ice injection site PRN \par Injection tolerated well.\par \par Examination of the Lumbar Spine \par Flexion 55 degrees\par Extension 12 degrees\par Lateral Bend R  28 degrees  L 25 degrees\par \par Palpation of the Lumbar Spine: Trigger points  bilateral gluteal musculature \par \par MMT L/E: intact \par \par \par \par

## 2022-12-27 ENCOUNTER — NON-APPOINTMENT (OUTPATIENT)
Age: 82
End: 2022-12-27

## 2023-01-09 ENCOUNTER — APPOINTMENT (OUTPATIENT)
Dept: PHYSICAL MEDICINE AND REHAB | Facility: CLINIC | Age: 83
End: 2023-01-09

## 2023-01-10 ENCOUNTER — NON-APPOINTMENT (OUTPATIENT)
Age: 83
End: 2023-01-10

## 2023-01-10 ENCOUNTER — APPOINTMENT (OUTPATIENT)
Dept: OPHTHALMOLOGY | Facility: CLINIC | Age: 83
End: 2023-01-10
Payer: MEDICARE

## 2023-01-10 PROCEDURE — 92014 COMPRE OPH EXAM EST PT 1/>: CPT

## 2023-01-10 PROCEDURE — 92250 FUNDUS PHOTOGRAPHY W/I&R: CPT

## 2023-01-17 ENCOUNTER — APPOINTMENT (OUTPATIENT)
Dept: PHYSICAL MEDICINE AND REHAB | Facility: CLINIC | Age: 83
End: 2023-01-17

## 2023-01-17 RX ADMIN — Medication 10 %: at 00:00

## 2023-01-24 ENCOUNTER — APPOINTMENT (OUTPATIENT)
Dept: PHYSICAL MEDICINE AND REHAB | Facility: CLINIC | Age: 83
End: 2023-01-24

## 2023-01-31 ENCOUNTER — APPOINTMENT (OUTPATIENT)
Dept: PHYSICAL MEDICINE AND REHAB | Facility: CLINIC | Age: 83
End: 2023-01-31

## 2023-02-03 ENCOUNTER — APPOINTMENT (OUTPATIENT)
Dept: OPHTHALMOLOGY | Facility: CLINIC | Age: 83
End: 2023-02-03
Payer: MEDICARE

## 2023-02-03 ENCOUNTER — NON-APPOINTMENT (OUTPATIENT)
Age: 83
End: 2023-02-03

## 2023-02-03 PROCEDURE — 99213 OFFICE O/P EST LOW 20 MIN: CPT

## 2023-02-14 ENCOUNTER — NON-APPOINTMENT (OUTPATIENT)
Age: 83
End: 2023-02-14

## 2023-02-14 ENCOUNTER — APPOINTMENT (OUTPATIENT)
Dept: OPHTHALMOLOGY | Facility: CLINIC | Age: 83
End: 2023-02-14
Payer: MEDICARE

## 2023-02-14 PROCEDURE — 99213 OFFICE O/P EST LOW 20 MIN: CPT

## 2023-03-15 ENCOUNTER — APPOINTMENT (OUTPATIENT)
Dept: PHYSICAL MEDICINE AND REHAB | Facility: CLINIC | Age: 83
End: 2023-03-15
Payer: MEDICARE

## 2023-03-15 PROCEDURE — 99213 OFFICE O/P EST LOW 20 MIN: CPT

## 2023-03-15 NOTE — HISTORY OF PRESENT ILLNESS
[FreeTextEntry1] : Pt complains of mid T/S pain for  the past two weeks with intermittent radiation of pain to the mid right axillary line. Denies trauma.

## 2023-03-15 NOTE — PHYSICAL EXAM
[FreeTextEntry1] : EXAMINATION OF THORACIC SPINE:\par \par Thoracic kyphosis\par \par Flexion: 50° \par Extension: 10°\par Lateral Bend: R: 30° \par                        L:  30°\par Rotation: R: 25\par                L: 30\par \par Reflexes: 2 and symmetrical throughout \par Sensory: Anterior chest wall intact \par Palpation of the Thoracic Spine: Tenderness involving the mid to lower T/S with associated muscle spasm. \par SLR: -60 degrees bilaterally\par \par MMT L/E: grossly intact\par \par \par \par

## 2023-03-15 NOTE — ASSESSMENT
[FreeTextEntry1] : Xray T/S \par Initiate TPIs 1x weekly/x6 weeks to T/S \par \par Recheck in 6 weeks/

## 2023-03-16 ENCOUNTER — RESULT REVIEW (OUTPATIENT)
Age: 83
End: 2023-03-16

## 2023-03-20 ENCOUNTER — APPOINTMENT (OUTPATIENT)
Dept: PHYSICAL MEDICINE AND REHAB | Facility: CLINIC | Age: 83
End: 2023-03-20
Payer: MEDICARE

## 2023-03-20 PROCEDURE — J3490M: CUSTOM | Mod: NC

## 2023-03-20 PROCEDURE — 20553 NJX 1/MLT TRIGGER POINTS 3/>: CPT

## 2023-03-20 PROCEDURE — 99213 OFFICE O/P EST LOW 20 MIN: CPT | Mod: 25

## 2023-03-20 RX ADMIN — Medication 10 %: at 00:00

## 2023-03-20 NOTE — PROCEDURE
[de-identified] : X-ray T/S reviewed with PT \par Presents in my office today for trigger point injection \par Sterile Technique Injection \par 2 Syringes of 5 cc 1 % Lidocaine HCL \par \par Right T5, T6, T7 paraspinal musculature \par \par Ice injection site PRN \par Injection tolerated well.\par \par \par EXAMINATION OF THORACIC SPINE:\par  \par Flexion:  50° \par Extension:  8°\par Lateral Bend: R: 28° \par                        L:  30°\par Rotation: R: 25 L: 30\par Palpation of the Thoracic Spine: Trigger points involving the T5, T6, T7 right paraspinal musculature. \par

## 2023-03-20 NOTE — ASSESSMENT
[FreeTextEntry1] : Pt advised to follow up with her PCP regarding hiatus hernia and calcification involving aortic arch.

## 2023-03-27 ENCOUNTER — APPOINTMENT (OUTPATIENT)
Dept: PHYSICAL MEDICINE AND REHAB | Facility: CLINIC | Age: 83
End: 2023-03-27

## 2023-05-08 ENCOUNTER — APPOINTMENT (OUTPATIENT)
Dept: PHYSICAL MEDICINE AND REHAB | Facility: CLINIC | Age: 83
End: 2023-05-08
Payer: MEDICARE

## 2023-05-08 PROCEDURE — J3490M: CUSTOM | Mod: NC

## 2023-05-08 PROCEDURE — 20553 NJX 1/MLT TRIGGER POINTS 3/>: CPT

## 2023-05-08 PROCEDURE — 99213 OFFICE O/P EST LOW 20 MIN: CPT | Mod: 25

## 2023-05-08 NOTE — PROCEDURE
[de-identified] : Pt reports over the past several weeks increasing low back pain with radicular symptoms involving her bilateral L/Es. Pt wishes to initiate TPIs to L/S.\par Sterile Technique Injection \par 2 Syringes of 5 cc 1 % Lidocaine HCL \par \par Left gluteus medius\par Left gluteus leland \par Left gluteus minimus \par \par Ice injection site PRN \par Injection tolerated well.\par \par \par EXAMINATION OF THORACIC SPINE:\par  \par Flexion:  50° \par Extension:  8°\par Lateral Bend: R: 30° \par                        L:  28°\par Rotation: R: 25 L: 30\par Palpation of the Thoracic Spine: Trigger points involving the T5, T6, T7 right paraspinal musculature improved. Tenderness and spasm involving left gluteal musculature. \par

## 2023-05-17 ENCOUNTER — APPOINTMENT (OUTPATIENT)
Dept: PHYSICAL MEDICINE AND REHAB | Facility: CLINIC | Age: 83
End: 2023-05-17
Payer: MEDICARE

## 2023-05-17 DIAGNOSIS — M54.30 SCIATICA, UNSPECIFIED SIDE: ICD-10-CM

## 2023-05-17 PROCEDURE — 99213 OFFICE O/P EST LOW 20 MIN: CPT | Mod: 25

## 2023-05-17 PROCEDURE — 20553 NJX 1/MLT TRIGGER POINTS 3/>: CPT

## 2023-05-17 PROCEDURE — J3490M: CUSTOM | Mod: NC

## 2023-05-17 NOTE — PROCEDURE
[de-identified] : Pt continues with c/o low back pain with intermittent t radiation of pain left gluteal musculature Pt reports making improvement since last injection \par Sterile Technique Injection \par 2 Syringes of 5 cc 1 % Lidocaine HCL \par \par Left gluteus medius\par Left gluteus leland \par Left gluteus minimus \par \par Ice injection site PRN \par Injection tolerated well.\par \par \par EXAMINATION OF THORACIC SPINE:\par  \par Flexion:  50° \par Extension:  10°\par Lateral Bend: R: 30° \par                        L:  28°\par Rotation: R: 25 L: 30\par Palpation of the Thoracic Spine: . Tenderness and spasm involving left gluteal musculature. \par

## 2023-05-20 ENCOUNTER — NON-APPOINTMENT (OUTPATIENT)
Age: 83
End: 2023-05-20

## 2023-05-20 ENCOUNTER — APPOINTMENT (OUTPATIENT)
Dept: OPHTHALMOLOGY | Facility: CLINIC | Age: 83
End: 2023-05-20
Payer: MEDICARE

## 2023-05-20 PROCEDURE — 92012 INTRM OPH EXAM EST PATIENT: CPT

## 2023-05-24 ENCOUNTER — APPOINTMENT (OUTPATIENT)
Dept: PHYSICAL MEDICINE AND REHAB | Facility: CLINIC | Age: 83
End: 2023-05-24
Payer: MEDICARE

## 2023-05-24 PROCEDURE — 99213 OFFICE O/P EST LOW 20 MIN: CPT | Mod: 25

## 2023-05-24 PROCEDURE — J3490M: CUSTOM | Mod: NC

## 2023-05-24 PROCEDURE — 20553 NJX 1/MLT TRIGGER POINTS 3/>: CPT

## 2023-05-24 NOTE — PROCEDURE
[de-identified] : Pt continues with c/o low back pain with intermittent t radiation of pain left gluteal musculature Pt reports making improvement since last injection \par Sterile Technique Injection \par 2 Syringes of 5 cc 1 % Lidocaine HCL \par \par Left gluteus medius\par Left gluteus leland \par Left gluteus minimus \par \par Ice injection site PRN \par Injection tolerated well.\par \par \par EXAMINATION OF THORACIC SPINE:\par  \par Flexion:  50° \par Extension:  12°\par Lateral Bend: R: 30° \par                        L:  30°\par Rotation: R: 25 L: 30\par Palpation of the Thoracic Spine: . Tenderness and spasm involving left gluteal musculature. \par

## 2023-05-31 ENCOUNTER — APPOINTMENT (OUTPATIENT)
Dept: PHYSICAL MEDICINE AND REHAB | Facility: CLINIC | Age: 83
End: 2023-05-31
Payer: MEDICARE

## 2023-05-31 PROCEDURE — J3490M: CUSTOM | Mod: NC

## 2023-05-31 PROCEDURE — 20553 NJX 1/MLT TRIGGER POINTS 3/>: CPT

## 2023-05-31 PROCEDURE — 99213 OFFICE O/P EST LOW 20 MIN: CPT | Mod: 25

## 2023-05-31 NOTE — PROCEDURE
[de-identified] : Pt continues with c/o low back pain with intermittent t radiation of pain left gluteal musculature.\par Sterile Technique Injection \par 2 Syringes of 5 cc 1 % Lidocaine HCL \par \par Left gluteus medius\par Left gluteus leland \par Left gluteus minimus \par \par Ice injection site PRN \par Injection tolerated well.\par \par \par EXAMINATION OF THORACIC SPINE:\par  \par Flexion:  52° \par Extension:  12°\par Lateral Bend: R: 30° \par                        L:  30°\par Rotation: R: 25 L: 30\par Palpation of the Thoracic Spine: . Tenderness and spasm involving left gluteal musculature. \par

## 2023-06-02 ENCOUNTER — APPOINTMENT (OUTPATIENT)
Dept: OPHTHALMOLOGY | Facility: CLINIC | Age: 83
End: 2023-06-02
Payer: MEDICARE

## 2023-06-02 ENCOUNTER — NON-APPOINTMENT (OUTPATIENT)
Age: 83
End: 2023-06-02

## 2023-06-02 PROCEDURE — 99213 OFFICE O/P EST LOW 20 MIN: CPT

## 2023-06-07 ENCOUNTER — APPOINTMENT (OUTPATIENT)
Dept: PHYSICAL MEDICINE AND REHAB | Facility: CLINIC | Age: 83
End: 2023-06-07

## 2023-06-14 ENCOUNTER — APPOINTMENT (OUTPATIENT)
Dept: PHYSICAL MEDICINE AND REHAB | Facility: CLINIC | Age: 83
End: 2023-06-14
Payer: MEDICARE

## 2023-06-14 DIAGNOSIS — I10 ESSENTIAL (PRIMARY) HYPERTENSION: ICD-10-CM

## 2023-06-14 DIAGNOSIS — M40.204 UNSPECIFIED KYPHOSIS, THORACIC REGION: ICD-10-CM

## 2023-06-14 DIAGNOSIS — M54.6 PAIN IN THORACIC SPINE: ICD-10-CM

## 2023-06-14 PROCEDURE — J3490M: CUSTOM | Mod: NC

## 2023-06-14 PROCEDURE — 99213 OFFICE O/P EST LOW 20 MIN: CPT | Mod: 25

## 2023-06-14 PROCEDURE — 20553 NJX 1/MLT TRIGGER POINTS 3/>: CPT

## 2023-06-14 NOTE — PROCEDURE
[de-identified] : Pt reports finding TPIs beneficial in decreasing L/S pain and dissipating muscle spasm. \par Sterile Technique Injection \par 2 Syringes of 5 cc 1 % Lidocaine HCL \par \par Left gluteus medius\par Left gluteus leland \par Left gluteus minimus \par \par Ice injection site PRN \par Injection tolerated well.\par \par \par EXAMINATION OF THORACIC SPINE:\par  \par Flexion:  52° \par Extension:  10°\par Lateral Bend: R: 30° \par                        L:  30°\par Rotation: R: 25 L: 30\par Palpation of the Thoracic Spine: . Tenderness and spasm involving left gluteal musculature. \par

## 2023-06-22 ENCOUNTER — NON-APPOINTMENT (OUTPATIENT)
Age: 83
End: 2023-06-22

## 2023-06-28 ENCOUNTER — APPOINTMENT (OUTPATIENT)
Dept: PHYSICAL MEDICINE AND REHAB | Facility: CLINIC | Age: 83
End: 2023-06-28
Payer: MEDICARE

## 2023-06-28 DIAGNOSIS — M51.9 UNSPECIFIED THORACIC, THORACOLUMBAR AND LUMBOSACRAL INTERVERTEBRAL DISC DISORDER: ICD-10-CM

## 2023-06-28 PROCEDURE — 99213 OFFICE O/P EST LOW 20 MIN: CPT

## 2023-06-28 NOTE — ASSESSMENT
[FreeTextEntry1] : Continue with TPIs 1x monthly/x 2 months \par Goals of which are to decrease pain, dissipate muscle spasm, increase ROM and improve level of function.\par HEP \par Recheck in 4 weeks

## 2023-06-28 NOTE — PHYSICAL EXAM
[Normal] : Heart rate was normal and rhythm regular, normal S1 and S2, no gallops, no murmurs and no pericardial rub [FreeTextEntry1] : EXAMINATION OF LUMBAR SPINE:\par  \par Flexion:  55° \par Extension:  10°\par Lateral Bend: R: 25° \par                        L:  30°\par Rotation: R: 25 L: 30\par Palpation of the Lumbar  Spine: . Trigger point left gluteal musculature. \par Reflexes 2 throughout \par MMT intact

## 2023-06-28 NOTE — HISTORY OF PRESENT ILLNESS
[FreeTextEntry1] : Pt reports finding TPIs beneficial in decreasing low back pain and dissipating muscle spasm and allowing her to perform ADL's with less difficulty and decrease use of pharmaceutical agents.

## 2023-07-05 ENCOUNTER — APPOINTMENT (OUTPATIENT)
Dept: PHYSICAL MEDICINE AND REHAB | Facility: CLINIC | Age: 83
End: 2023-07-05

## 2023-07-06 ENCOUNTER — APPOINTMENT (OUTPATIENT)
Dept: PHYSICAL MEDICINE AND REHAB | Facility: CLINIC | Age: 83
End: 2023-07-06
Payer: MEDICARE

## 2023-07-06 DIAGNOSIS — M54.59 OTHER LOW BACK PAIN: ICD-10-CM

## 2023-07-06 PROCEDURE — 20553 NJX 1/MLT TRIGGER POINTS 3/>: CPT

## 2023-07-06 PROCEDURE — J3490M: CUSTOM | Mod: NC

## 2023-07-06 PROCEDURE — 99213 OFFICE O/P EST LOW 20 MIN: CPT | Mod: 25

## 2023-07-06 NOTE — PROCEDURE
[de-identified] : Pt reports finding TPIs beneficial in decreasing L/S pain and dissipating muscle spasm. \par \par Sterile Technique Injection \par 2 Syringes of 5 cc 1 % Lidocaine HCL \par \par Left gluteus medius\par Left gluteus leland \par Left gluteus minimus \par \par Ice injection site PRN \par Injection tolerated well.\par \par \par EXAMINATION OF THORACIC SPINE:\par  \par Flexion:  55° \par Extension:  10°\par Lateral Bend: R: 25° \par                        L:  30°\par Rotation: R: 25 L: 25\par Palpation of the Thoracic Spine: . Tenderness and spasm involving left gluteal musculature. \par

## 2023-07-19 ENCOUNTER — APPOINTMENT (OUTPATIENT)
Dept: PHYSICAL MEDICINE AND REHAB | Facility: CLINIC | Age: 83
End: 2023-07-19

## 2023-07-26 ENCOUNTER — APPOINTMENT (OUTPATIENT)
Dept: PHYSICAL MEDICINE AND REHAB | Facility: CLINIC | Age: 83
End: 2023-07-26

## 2023-07-28 ENCOUNTER — APPOINTMENT (OUTPATIENT)
Dept: OPHTHALMOLOGY | Facility: CLINIC | Age: 83
End: 2023-07-28
Payer: MEDICARE

## 2023-07-28 ENCOUNTER — NON-APPOINTMENT (OUTPATIENT)
Age: 83
End: 2023-07-28

## 2023-07-28 PROCEDURE — 92014 COMPRE OPH EXAM EST PT 1/>: CPT

## 2023-07-28 PROCEDURE — 92134 CPTRZ OPH DX IMG PST SGM RTA: CPT

## 2023-08-08 ENCOUNTER — APPOINTMENT (OUTPATIENT)
Dept: PHYSICAL MEDICINE AND REHAB | Facility: CLINIC | Age: 83
End: 2023-08-08

## 2023-08-22 ENCOUNTER — APPOINTMENT (OUTPATIENT)
Dept: OPHTHALMOLOGY | Facility: CLINIC | Age: 83
End: 2023-08-22

## 2023-10-18 ENCOUNTER — APPOINTMENT (OUTPATIENT)
Dept: PHYSICAL MEDICINE AND REHAB | Facility: CLINIC | Age: 83
End: 2023-10-18
Payer: MEDICARE

## 2023-10-18 ENCOUNTER — APPOINTMENT (OUTPATIENT)
Dept: OPHTHALMOLOGY | Facility: CLINIC | Age: 83
End: 2023-10-18

## 2023-10-18 PROCEDURE — 99213 OFFICE O/P EST LOW 20 MIN: CPT | Mod: 25

## 2023-10-18 PROCEDURE — 20553 NJX 1/MLT TRIGGER POINTS 3/>: CPT

## 2023-11-13 ENCOUNTER — APPOINTMENT (OUTPATIENT)
Dept: PHYSICAL MEDICINE AND REHAB | Facility: CLINIC | Age: 83
End: 2023-11-13

## 2023-11-22 ENCOUNTER — NON-APPOINTMENT (OUTPATIENT)
Age: 83
End: 2023-11-22

## 2023-11-22 ENCOUNTER — APPOINTMENT (OUTPATIENT)
Dept: OPHTHALMOLOGY | Facility: CLINIC | Age: 83
End: 2023-11-22
Payer: MEDICARE

## 2023-11-22 PROCEDURE — 99213 OFFICE O/P EST LOW 20 MIN: CPT

## 2024-01-10 ENCOUNTER — APPOINTMENT (OUTPATIENT)
Dept: OPHTHALMOLOGY | Facility: CLINIC | Age: 84
End: 2024-01-10

## 2024-03-18 ENCOUNTER — APPOINTMENT (OUTPATIENT)
Dept: OPHTHALMOLOGY | Facility: CLINIC | Age: 84
End: 2024-03-18
Payer: MEDICARE

## 2024-03-18 ENCOUNTER — NON-APPOINTMENT (OUTPATIENT)
Age: 84
End: 2024-03-18

## 2024-03-18 PROCEDURE — 92134 CPTRZ OPH DX IMG PST SGM RTA: CPT

## 2024-03-18 PROCEDURE — 92014 COMPRE OPH EXAM EST PT 1/>: CPT

## 2024-04-01 ENCOUNTER — APPOINTMENT (OUTPATIENT)
Dept: OPHTHALMOLOGY | Facility: CLINIC | Age: 84
End: 2024-04-01

## 2024-05-06 ENCOUNTER — APPOINTMENT (OUTPATIENT)
Dept: PHYSICAL MEDICINE AND REHAB | Facility: CLINIC | Age: 84
End: 2024-05-06
Payer: MEDICARE

## 2024-05-06 PROCEDURE — 99213 OFFICE O/P EST LOW 20 MIN: CPT | Mod: 25

## 2024-05-06 PROCEDURE — 20553 NJX 1/MLT TRIGGER POINTS 3/>: CPT

## 2024-05-06 NOTE — HISTORY OF PRESENT ILLNESS
[FreeTextEntry1] : Patient reports experiencing acute exacerbation of low back pain approximately 3 to 4 weeks ago.  Reports intermittent radicular symptoms involving the left lower extremity.  Pain is aggravated with prolonged sitting and/or standing.  Patient presents today for evaluation of low back pain patient informs me that she recently has initiated physical therapy per her PCP

## 2024-05-06 NOTE — PHYSICAL EXAM
[Normal] : Heart rate was normal and rhythm regular, normal S1 and S2, no gallops, no murmurs and no pericardial rub [FreeTextEntry1] : EXAMINATION OF LUMBAR SPINE:   Flexion: 45  degrees Extension:  neutral  Lateral Bend: R: 25  degrees                        L:  20 degrees Rotation: R: 25 degrees  L: 25 degrees Palpation of the Lumbar Spine: isolated trigger points involving trigger point left gluteal maximums and Medius, left piriformis musculature.  Reflexes 2 throughout  MMT intact    After today's examination additional trigger points were identified involving the left gluteal  maximums and Medius, left piriformis musculature. , thus indicating the need for additional trigger point therapy.   Goals of which are to decrease pain, dissipate muscle spasm, increase ROM and improve level of function. Multiple areas were identified using palpation guidance. Using aseptic technique, each of these areas bilateral Gluteus Medius Bilateral Gluteus leland were isolated and the skin prepped with alcohol.   A 22 gauge 1 1/2 inch needle was inserted to the level of the muscle. At this point, a slight twitch was elicited and in some cases the patient identified referred pain to areas distant from the injection site.  All of these were consistent with the definition of a trigger point. Aspiration revealed no blood and a mixture of 5cc 1 % Lidocaine HCL was injected in increments of 3-4 mL into each of these areas for a total of 4 sites. The needle was removed. Hemostasis was achieved with direct pressure.   The patient tolerated the procedure well. Post-procedure exam did not reveal any new neurological deficits. The patient was instructed to call with fever, chills, increased pain, redness or swelling at the injection site, or numbness or weakness in the leg. The patient was discharged home in good condition with post-procedural instructions.    Pt reports finding TPIs beneficial in decreasing L/S pain and dissipating muscle spasm.  Sterile Technique Injection 2 Syringes of 5 cc 1 % Lidocaine HCL  Left gluteus medius Left gluteus leland Left gluteus minimus  Ice injection site PRN Injection tolerated well.

## 2024-05-14 ENCOUNTER — APPOINTMENT (OUTPATIENT)
Dept: PHYSICAL MEDICINE AND REHAB | Facility: CLINIC | Age: 84
End: 2024-05-14
Payer: MEDICARE

## 2024-05-14 PROCEDURE — 20553 NJX 1/MLT TRIGGER POINTS 3/>: CPT

## 2024-05-14 PROCEDURE — 99213 OFFICE O/P EST LOW 20 MIN: CPT | Mod: 25

## 2024-05-14 NOTE — PHYSICAL EXAM
[FreeTextEntry1] : EXAMINATION OF LUMBAR SPINE:  Flexion: 50 degrees Extension: neutral Lateral Bend: R: 25 degrees  L: 20 degrees Rotation: R: 25 degrees L: 25 degrees Palpation of the Lumbar Spine: isolated trigger points involving trigger point left gluteal maximums and Medius, left L5 paraspinal musculature Reflexes 2 throughout MMT intact  After today's examination additional trigger points were identified involving theleft gluteal maximums and Medius, left L5 paraspinal musculature  thus indicating the need for additional trigger point therapy.   Goals of which are to decrease pain, dissipate muscle spasm, increase ROM and improve level of function. Multiple areas were identified using palpation guidance. Using aseptic technique, each of these areas bilateral Gluteus Medius Bilateral Gluteus leland were isolated and the skin prepped with alcohol.   A 22 gauge 1 1/2 inch needle was inserted to the level of the muscle. At this point, a slight twitch was elicited and in some cases the patient identified referred pain to areas distant from the injection site.  All of these were consistent with the definition of a trigger point. Aspiration revealed no blood and a mixture of 5cc 1 % Lidocaine HCL was injected in increments of 3-4 mL into each of these areas for a total of 4 sites. The needle was removed. Hemostasis was achieved with direct pressure.   The patient tolerated the procedure well. Post-procedure exam did not reveal any new neurological deficits. The patient was instructed to call with fever, chills, increased pain, redness or swelling at the injection site, or numbness or weakness in the leg. The patient was discharged home in good condition with post-procedural instructions.    Pt reports finding TPIs beneficial in decreasing L/S pain and dissipating muscle spasm.  Sterile Technique Injection 2 Syringes of 5 cc 1 % Lidocaine HCL left gluteal maximums and Medius, left L5 paraspinal musculature  Ice injection site PRN Injection tolerated well.    At least 20 minutes was spent with patient face to face examining patient, reviewing findings/results, counseling patient and coordinating treatment program. Ample time was provided to answer any questions or address concerns to the patient's satisfaction.

## 2024-05-14 NOTE — HISTORY OF PRESENT ILLNESS
[FreeTextEntry1] : Patient reports some improvement with TPI injection last visit however is continuing to complain of low back pain with radicular symptoms involving her left lower extremity.

## 2024-05-16 ENCOUNTER — RESULT REVIEW (OUTPATIENT)
Age: 84
End: 2024-05-16

## 2024-05-20 ENCOUNTER — APPOINTMENT (OUTPATIENT)
Dept: PHYSICAL MEDICINE AND REHAB | Facility: CLINIC | Age: 84
End: 2024-05-20
Payer: MEDICARE

## 2024-05-20 PROCEDURE — 99213 OFFICE O/P EST LOW 20 MIN: CPT | Mod: 25

## 2024-05-20 PROCEDURE — 20553 NJX 1/MLT TRIGGER POINTS 3/>: CPT

## 2024-05-20 NOTE — HISTORY OF PRESENT ILLNESS
[FreeTextEntry1] : Patient reporting some improvement following last session of TPI therapy however continues to complain of low back back pain with radicular symptoms involving her left lower extremity.  MRI results of the LS reviewed with the patient.

## 2024-05-20 NOTE — PHYSICAL EXAM
[FreeTextEntry1] : EXAMINATION OF LUMBAR SPINE:  Flexion: 50 degrees Extension: neutral Lateral Bend: R: 25 degrees L: 25 degrees Rotation: R: 30 degrees L: 25 degrees Palpation of the Lumbar Spine: isolated trigger points involving trigger point left gluteal maximums and Medius, left piriformis musculature Reflexes 2 throughout MMT intact  After today's examination additional trigger points were identified involving the  left gluteal maximums and Medius, left piriformis musculature thus indicating the need for additional trigger point therapy.   Goals of which are to decrease pain, dissipate muscle spasm, increase ROM and improve level of function. Multiple areas were identified using palpation guidance. Using aseptic technique, each of these areas left gluteal maximums and Medius, left piriformis musculaturewere isolated and the skin prepped with alcohol.   A 22 gauge 1 1/2 inch needle was inserted to the level of the muscle. At this point, a slight twitch was elicited and in some cases the patient identified referred pain to areas distant from the injection site.  All of these were consistent with the definition of a trigger point. Aspiration revealed no blood and a mixture of 5cc 1 % Lidocaine HCL was injected in increments of 3-4 mL into each of these areas for a total of 3 sites. The needle was removed. Hemostasis was achieved with direct pressure.   The patient tolerated the procedure well. Post-procedure exam did not reveal any new neurological deficits. The patient was instructed to call with fever, chills, increased pain, redness or swelling at the injection site, or numbness or weakness in the leg. The patient was discharged home in good condition with post-procedural instructions.    Pt reports finding TPIs beneficial in decreasing L/S pain and dissipating muscle spasm.  Sterile Technique Injection 2 Syringes of 5 cc 1 % Lidocaine HCL   left gluteal maximums and Medius, left piriformis musculature Ice injection site PRN Injection tolerated well.    At least 20 minutes was spent with patient face to face examining patient, reviewing findings/results, counseling patient and coordinating treatment program. Ample time was provided to answer any questions or address concerns to the patient's satisfaction.

## 2024-06-02 NOTE — HISTORY OF PRESENT ILLNESS
[FreeTextEntry1] : Pt reports she continues to find trigger point injections beneficial in decreasing her low back pain and improving her overall level of function. done

## 2024-06-04 ENCOUNTER — APPOINTMENT (OUTPATIENT)
Dept: PHYSICAL MEDICINE AND REHAB | Facility: CLINIC | Age: 84
End: 2024-06-04
Payer: MEDICARE

## 2024-06-04 PROCEDURE — 99213 OFFICE O/P EST LOW 20 MIN: CPT | Mod: 25

## 2024-06-04 PROCEDURE — 20553 NJX 1/MLT TRIGGER POINTS 3/>: CPT

## 2024-06-04 NOTE — HISTORY OF PRESENT ILLNESS
[FreeTextEntry1] : Patient continues to complain of low back pain.  However reports improvement since last session of TPI therapy.  Reports diminished pain as well as reduced radicular symptoms involving lower extremities.  Presents today for reevaluation of low back pain

## 2024-06-04 NOTE — PHYSICAL EXAM
[FreeTextEntry1] : EXAMINATION OF LUMBAR SPINE:  Flexion: 55 degrees Extension: neutral Lateral Bend: R: 25 degrees L: 25 degrees Rotation: R: 30 degrees L: 25 degrees Palpation of the Lumbar Spine: Isolated trigger points identified involving the bilateral gluteus leland and medius musculature Reflexes 2 throughout MMT intact  After today's examination additional trigger points were identified involving the bilateral gluteus leland and medius musculature thus indicating the need for additional trigger point therapy.   Goals of which are to decrease pain, dissipate muscle spasm, increase ROM and improve level of function. Multiple areas were identified using palpation guidance. Using aseptic technique, each of these areas left gluteal maximums and Medius, left piriformis musculaturewere isolated and the skin prepped with alcohol.   A 22 gauge 1 1/2 inch needle was inserted to the level of the muscle. At this point, a slight twitch was elicited and in some cases the patient identified referred pain to areas distant from the injection site.  All of these were consistent with the definition of a trigger point. Aspiration revealed no blood and a mixture of 5cc 1 % Lidocaine HCL was injected in increments of 3-4 mL into each of these areas for a total of 3 sites. The needle was removed. Hemostasis was achieved with direct pressure.   The patient tolerated the procedure well. Post-procedure exam did not reveal any new neurological deficits. The patient was instructed to call with fever, chills, increased pain, redness or swelling at the injection site, or numbness or weakness in the leg. The patient was discharged home in good condition with post-procedural instructions.    Pt reports finding TPIs beneficial in decreasing L/S pain and dissipating muscle spasm.  Sterile Technique Injection 2 Syringes of 5 cc 1 % Lidocaine HCL  bilateral gluteus leland and medius musculature Ice injection site PRN Injection tolerated well.    At least 20 minutes was spent with patient face to face examining patient, reviewing findings/results, counseling patient and coordinating treatment program. Ample time was provided to answer any questions or address concerns to the patient's satisfaction.

## 2024-06-11 ENCOUNTER — APPOINTMENT (OUTPATIENT)
Dept: PHYSICAL MEDICINE AND REHAB | Facility: CLINIC | Age: 84
End: 2024-06-11
Payer: MEDICARE

## 2024-06-11 DIAGNOSIS — M62.838 OTHER MUSCLE SPASM: ICD-10-CM

## 2024-06-11 PROCEDURE — 99213 OFFICE O/P EST LOW 20 MIN: CPT | Mod: 25

## 2024-06-11 PROCEDURE — 20553 NJX 1/MLT TRIGGER POINTS 3/>: CPT

## 2024-06-11 NOTE — HISTORY OF PRESENT ILLNESS
[FreeTextEntry1] : Patient continues to find TPI therapy beneficial.  She continues to complain of low back pain however it is diminishing.  Radicular symptoms in left lower extremity are improving as well.  Patient continues with physical therapy.

## 2024-06-11 NOTE — PHYSICAL EXAM
[FreeTextEntry1] : EXAMINATION OF LUMBAR SPINE:  Flexion:60 degrees Extension: 5 degrees Lateral Bend: R: 25 degrees L: 30 degrees Rotation: R: 30 degrees L: 25 degrees Palpation of the Lumbar Spine: Isolated trigger points identified involving the left gluteus leland medius and piriformis musculature Reflexes 2 throughout MMT intact  After today's examination additional trigger points were identified involving the   left gluteus leland medius and piriformis musculaturethus indicating the need for additional trigger point therapy.   Goals of which are to decrease pain, dissipate muscle spasm, increase ROM and improve level of function. Multiple areas were identified using palpation guidance. Using aseptic technique, each of these areas left gluteal maximums and Medius, left piriformis musculaturewere isolated and the skin prepped with alcohol.   A 22 gauge 1 1/2 inch needle was inserted to the level of the muscle. At this point, a slight twitch was elicited and in some cases the patient identified referred pain to areas distant from the injection site.  All of these were consistent with the definition of a trigger point. Aspiration revealed no blood and a mixture of 5cc 1 % Lidocaine HCL was injected in increments of 3-4 mL into each of these areas for a total of 3 sites. The needle was removed. Hemostasis was achieved with direct pressure.   The patient tolerated the procedure well. Post-procedure exam did not reveal any new neurological deficits. The patient was instructed to call with fever, chills, increased pain, redness or swelling at the injection site, or numbness or weakness in the leg. The patient was discharged home in good condition with post-procedural instructions.    Pt reports finding TPIs beneficial in decreasing L/S pain and dissipating muscle spasm.  Sterile Technique Injection 2 Syringes of 5 cc 1 % Lidocaine HCL  bilateral gluteus leland and medius musculature Ice injection site PRN Injection tolerated well.    At least 20 minutes was spent with patient face to face examining patient, reviewing findings/results, counseling patient and coordinating treatment program. Ample time was provided to answer any questions or address concerns to the patient's satisfaction.

## 2024-06-12 ENCOUNTER — APPOINTMENT (OUTPATIENT)
Dept: PHYSICAL MEDICINE AND REHAB | Facility: CLINIC | Age: 84
End: 2024-06-12

## 2024-06-19 ENCOUNTER — APPOINTMENT (OUTPATIENT)
Dept: PHYSICAL MEDICINE AND REHAB | Facility: CLINIC | Age: 84
End: 2024-06-19
Payer: MEDICARE

## 2024-06-19 DIAGNOSIS — M48.061 SPINAL STENOSIS, LUMBAR REGION WITHOUT NEUROGENIC CLAUDICATION: ICD-10-CM

## 2024-06-19 DIAGNOSIS — M51.37 OTHER INTERVERTEBRAL DISC DEGENERATION, LUMBOSACRAL REGION: ICD-10-CM

## 2024-06-19 DIAGNOSIS — M54.50 LOW BACK PAIN, UNSPECIFIED: ICD-10-CM

## 2024-06-19 DIAGNOSIS — G57.02 LESION OF SCIATIC NERVE, LEFT LOWER LIMB: ICD-10-CM

## 2024-06-19 DIAGNOSIS — M54.16 RADICULOPATHY, LUMBAR REGION: ICD-10-CM

## 2024-06-19 DIAGNOSIS — G89.29 LOW BACK PAIN, UNSPECIFIED: ICD-10-CM

## 2024-06-19 DIAGNOSIS — M47.816 SPONDYLOSIS W/OUT MYELOPATHY OR RADICULOPATHY, LUMBAR REGION: ICD-10-CM

## 2024-06-19 PROCEDURE — 20553 NJX 1/MLT TRIGGER POINTS 3/>: CPT

## 2024-06-19 PROCEDURE — 99213 OFFICE O/P EST LOW 20 MIN: CPT | Mod: 25

## 2024-06-19 NOTE — HISTORY OF PRESENT ILLNESS
[FreeTextEntry1] : Patient continues to find TPI therapy beneficial.  She continues to complain of intermittent low back pain. Radicular symptoms improved. Able to sit, stand, and ambulate for long distances.

## 2024-06-19 NOTE — PHYSICAL EXAM
[FreeTextEntry1] : EXAMINATION OF LUMBAR SPINE:  Flexion:60 degrees Extension: 10 degrees Lateral Bend: R: 25 degrees L: 30 degrees Rotation: R: 30 degrees L: 25 degrees Palpation of the Lumbar Spine: Isolated trigger points identified involving the left gluteus leland and medius musculature.   Reflexes 2 throughout MMT intact  After today's examination additional trigger points were identified involving the   left gluteus leland medius and musculature, thus indicating the need for additional trigger point therapy.   Goals of which are to decrease pain, dissipate muscle spasm, increase ROM and improve level of function. Multiple areas were identified using palpation guidance. Using aseptic technique, each of these areas left gluteal maximums and Medius, musculature were isolated and the skin prepped with alcohol.   A 22 gauge 1 1/2 inch needle was inserted to the level of the muscle. At this point, a slight twitch was elicited and in some cases the patient identified referred pain to areas distant from the injection site.  All of these were consistent with the definition of a trigger point. Aspiration revealed no blood and a mixture of 5cc 1 % Lidocaine HCL was injected in increments of 3-4 mL into each of these areas for a total of 3 sites. The needle was removed. Hemostasis was achieved with direct pressure.   The patient tolerated the procedure well. Post-procedure exam did not reveal any new neurological deficits. The patient was instructed to call with fever, chills, increased pain, redness or swelling at the injection site, or numbness or weakness in the leg. The patient was discharged home in good condition with post-procedural instructions.    Pt reports finding TPIs beneficial in decreasing L/S pain and dissipating muscle spasm.  Sterile Technique Injection 2 Syringes of 5 cc 1 % Lidocaine HCL  LT gluteus leland and medius musculature Ice injection site PRN Injection tolerated well.   Recheck in 4 weeks.   At least 20 minutes was spent with patient face to face examining patient, reviewing findings/results, counseling patient and coordinating treatment program. Ample time was provided to answer any questions or address concerns to the patient's satisfaction.

## 2024-07-05 ENCOUNTER — NON-APPOINTMENT (OUTPATIENT)
Age: 84
End: 2024-07-05

## 2024-07-23 ENCOUNTER — APPOINTMENT (OUTPATIENT)
Dept: PHYSICAL MEDICINE AND REHAB | Facility: CLINIC | Age: 84
End: 2024-07-23

## 2024-08-12 ENCOUNTER — APPOINTMENT (OUTPATIENT)
Dept: OPHTHALMOLOGY | Facility: CLINIC | Age: 84
End: 2024-08-12
Payer: MEDICARE

## 2024-08-12 ENCOUNTER — NON-APPOINTMENT (OUTPATIENT)
Age: 84
End: 2024-08-12

## 2024-08-12 PROCEDURE — 99213 OFFICE O/P EST LOW 20 MIN: CPT

## 2024-10-07 ENCOUNTER — NON-APPOINTMENT (OUTPATIENT)
Age: 84
End: 2024-10-07

## 2024-10-07 ENCOUNTER — APPOINTMENT (OUTPATIENT)
Dept: OPHTHALMOLOGY | Facility: CLINIC | Age: 84
End: 2024-10-07
Payer: MEDICARE

## 2024-10-07 PROCEDURE — 92014 COMPRE OPH EXAM EST PT 1/>: CPT

## 2025-03-05 ENCOUNTER — APPOINTMENT (OUTPATIENT)
Dept: OPHTHALMOLOGY | Facility: CLINIC | Age: 85
End: 2025-03-05
Payer: MEDICARE

## 2025-03-05 ENCOUNTER — NON-APPOINTMENT (OUTPATIENT)
Age: 85
End: 2025-03-05

## 2025-03-05 PROCEDURE — 99213 OFFICE O/P EST LOW 20 MIN: CPT

## 2025-03-25 DIAGNOSIS — N81.9 FEMALE GENITAL PROLAPSE, UNSPECIFIED: ICD-10-CM

## 2025-03-25 DIAGNOSIS — N39.0 URINARY TRACT INFECTION, SITE NOT SPECIFIED: ICD-10-CM

## 2025-03-25 DIAGNOSIS — N30.90 CYSTITIS, UNSPECIFIED W/OUT HEMATURIA: ICD-10-CM

## 2025-03-31 ENCOUNTER — APPOINTMENT (OUTPATIENT)
Dept: INFECTIOUS DISEASE | Facility: CLINIC | Age: 85
End: 2025-03-31
Payer: MEDICARE

## 2025-03-31 ENCOUNTER — NON-APPOINTMENT (OUTPATIENT)
Age: 85
End: 2025-03-31

## 2025-03-31 VITALS
TEMPERATURE: 98.2 F | SYSTOLIC BLOOD PRESSURE: 112 MMHG | WEIGHT: 139 LBS | DIASTOLIC BLOOD PRESSURE: 68 MMHG | BODY MASS INDEX: 23.16 KG/M2 | HEIGHT: 65 IN | OXYGEN SATURATION: 96 % | HEART RATE: 80 BPM

## 2025-03-31 DIAGNOSIS — N39.0 URINARY TRACT INFECTION, SITE NOT SPECIFIED: ICD-10-CM

## 2025-03-31 PROCEDURE — 99203 OFFICE O/P NEW LOW 30 MIN: CPT

## 2025-03-31 RX ORDER — BUDESONIDE 1 MG/2ML
INHALANT ORAL
Refills: 0 | Status: ACTIVE | COMMUNITY

## 2025-03-31 RX ORDER — MULTIVIT WITH MIN/MFOLATE/K2 340-15/3 G
5000 POWDER (GRAM) ORAL
Refills: 0 | Status: ACTIVE | COMMUNITY

## 2025-03-31 RX ORDER — OLOPATADINE HYDROCHLORIDE AND MOMETASONE FUROATE 25; 665 UG/1; UG/1
665-25 SPRAY, METERED NASAL
Refills: 0 | Status: ACTIVE | COMMUNITY

## 2025-03-31 RX ORDER — REPAGLINIDE 1 MG/1
1 TABLET ORAL
Refills: 0 | Status: ACTIVE | COMMUNITY

## 2025-03-31 RX ORDER — METOPROLOL SUCCINATE 25 MG/1
25 TABLET, EXTENDED RELEASE ORAL
Refills: 0 | Status: ACTIVE | COMMUNITY

## 2025-03-31 RX ORDER — ISOSORBIDE MONONITRATE 30 MG
30 TABLET, EXTENDED RELEASE 24 HR ORAL
Refills: 0 | Status: ACTIVE | COMMUNITY

## 2025-03-31 RX ORDER — FOSFOMYCIN TROMETHAMINE 3 G/1
3 POWDER ORAL
Qty: 4 | Refills: 6 | Status: ACTIVE | COMMUNITY
Start: 2025-03-31 | End: 1900-01-01

## 2025-04-07 ENCOUNTER — APPOINTMENT (OUTPATIENT)
Dept: OPHTHALMOLOGY | Facility: CLINIC | Age: 85
End: 2025-04-07
Payer: MEDICARE

## 2025-04-07 ENCOUNTER — NON-APPOINTMENT (OUTPATIENT)
Age: 85
End: 2025-04-07

## 2025-04-07 PROCEDURE — 92014 COMPRE OPH EXAM EST PT 1/>: CPT

## 2025-04-07 PROCEDURE — 92134 CPTRZ OPH DX IMG PST SGM RTA: CPT

## 2025-04-26 ENCOUNTER — NON-APPOINTMENT (OUTPATIENT)
Age: 85
End: 2025-04-26

## 2025-04-28 ENCOUNTER — APPOINTMENT (OUTPATIENT)
Dept: INFECTIOUS DISEASE | Facility: CLINIC | Age: 85
End: 2025-04-28
Payer: MEDICARE

## 2025-04-28 VITALS — DIASTOLIC BLOOD PRESSURE: 74 MMHG | SYSTOLIC BLOOD PRESSURE: 132 MMHG | HEART RATE: 82 BPM | TEMPERATURE: 97.9 F

## 2025-04-28 DIAGNOSIS — N39.0 URINARY TRACT INFECTION, SITE NOT SPECIFIED: ICD-10-CM

## 2025-04-28 PROCEDURE — 99214 OFFICE O/P EST MOD 30 MIN: CPT

## 2025-04-28 RX ORDER — FOSFOMYCIN TROMETHAMINE 3 G/1
3 POWDER ORAL
Qty: 4 | Refills: 6 | Status: ACTIVE | COMMUNITY
Start: 2025-04-28 | End: 1900-01-01

## 2025-04-28 RX ORDER — FLUCONAZOLE 100 MG/1
100 TABLET ORAL
Qty: 6 | Refills: 0 | Status: ACTIVE | COMMUNITY
Start: 2025-04-28 | End: 1900-01-01

## 2025-07-28 ENCOUNTER — APPOINTMENT (OUTPATIENT)
Dept: INFECTIOUS DISEASE | Facility: CLINIC | Age: 85
End: 2025-07-28
Payer: MEDICARE

## 2025-07-28 VITALS — DIASTOLIC BLOOD PRESSURE: 62 MMHG | SYSTOLIC BLOOD PRESSURE: 110 MMHG | HEART RATE: 73 BPM | TEMPERATURE: 97.8 F

## 2025-07-28 DIAGNOSIS — N39.0 URINARY TRACT INFECTION, SITE NOT SPECIFIED: ICD-10-CM

## 2025-07-28 PROCEDURE — 99214 OFFICE O/P EST MOD 30 MIN: CPT

## 2025-09-10 ENCOUNTER — NON-APPOINTMENT (OUTPATIENT)
Age: 85
End: 2025-09-10

## 2025-09-11 ENCOUNTER — NON-APPOINTMENT (OUTPATIENT)
Age: 85
End: 2025-09-11

## 2025-09-11 RX ORDER — CEFPODOXIME PROXETIL 200 MG/1
200 TABLET, FILM COATED ORAL
Qty: 6 | Refills: 0 | Status: ACTIVE | COMMUNITY
Start: 2025-09-11 | End: 1900-01-01

## 2025-09-15 ENCOUNTER — APPOINTMENT (OUTPATIENT)
Dept: INFECTIOUS DISEASE | Facility: CLINIC | Age: 85
End: 2025-09-15
Payer: MEDICARE

## 2025-09-15 VITALS
DIASTOLIC BLOOD PRESSURE: 78 MMHG | HEART RATE: 77 BPM | OXYGEN SATURATION: 99 % | SYSTOLIC BLOOD PRESSURE: 130 MMHG | WEIGHT: 140 LBS | HEIGHT: 65 IN | TEMPERATURE: 98.4 F | BODY MASS INDEX: 23.32 KG/M2

## 2025-09-15 DIAGNOSIS — N39.0 URINARY TRACT INFECTION, SITE NOT SPECIFIED: ICD-10-CM

## 2025-09-15 PROCEDURE — 99214 OFFICE O/P EST MOD 30 MIN: CPT

## 2025-09-15 RX ORDER — SERTRALINE HYDROCHLORIDE 50 MG/1
50 TABLET, FILM COATED ORAL
Refills: 0 | Status: ACTIVE | COMMUNITY

## 2025-09-15 RX ORDER — FOSFOMYCIN TROMETHAMINE 3 G/1
3 POWDER ORAL
Qty: 4 | Refills: 6 | Status: ACTIVE | COMMUNITY
Start: 2025-09-15 | End: 1900-01-01

## 2025-09-19 ENCOUNTER — NON-APPOINTMENT (OUTPATIENT)
Age: 85
End: 2025-09-19

## 2025-09-19 ENCOUNTER — APPOINTMENT (OUTPATIENT)
Dept: OPHTHALMOLOGY | Facility: CLINIC | Age: 85
End: 2025-09-19
Payer: MEDICARE

## 2025-09-19 PROCEDURE — 92014 COMPRE OPH EXAM EST PT 1/>: CPT

## 2025-09-19 PROCEDURE — 92250 FUNDUS PHOTOGRAPHY W/I&R: CPT
